# Patient Record
Sex: FEMALE | Race: WHITE
[De-identification: names, ages, dates, MRNs, and addresses within clinical notes are randomized per-mention and may not be internally consistent; named-entity substitution may affect disease eponyms.]

---

## 2019-06-05 ENCOUNTER — HOSPITAL ENCOUNTER (EMERGENCY)
Dept: HOSPITAL 91 - FTE | Age: 29
Discharge: HOME | End: 2019-06-05
Payer: COMMERCIAL

## 2019-06-05 ENCOUNTER — HOSPITAL ENCOUNTER (EMERGENCY)
Dept: HOSPITAL 10 - FTE | Age: 29
Discharge: HOME | End: 2019-06-05
Payer: COMMERCIAL

## 2019-06-05 VITALS — DIASTOLIC BLOOD PRESSURE: 81 MMHG | HEART RATE: 80 BPM | SYSTOLIC BLOOD PRESSURE: 107 MMHG | RESPIRATION RATE: 19 BRPM

## 2019-06-05 VITALS
HEIGHT: 68 IN | WEIGHT: 187.39 LBS | BODY MASS INDEX: 28.4 KG/M2 | WEIGHT: 187.39 LBS | HEIGHT: 68 IN | BODY MASS INDEX: 28.4 KG/M2

## 2019-06-05 DIAGNOSIS — R20.2: Primary | ICD-10-CM

## 2019-06-05 PROCEDURE — 81025 URINE PREGNANCY TEST: CPT

## 2019-06-05 PROCEDURE — 99283 EMERGENCY DEPT VISIT LOW MDM: CPT

## 2019-06-05 RX ADMIN — LORAZEPAM 1 MG: 0.5 TABLET ORAL at 19:32

## 2019-06-05 NOTE — ERD
ER Documentation


Chief Complaint


Chief Complaint





arm numbness/tingling sensation;dry lips;dizziness-pt drink alcohol last ni





HPI


28-year-old female, previously healthy, presents to the emergency department, 


complaining of sudden onset of dizziness, chest pain, palpitations associated 


with perioral numbness and finger paresthesias.  She has had similar episodes in


the past but less intense.  She is not taking any medication at this time.  His


tory provided by patient.  The patient denies headache, no distal weakness, 


numbness or tingling, no fever or chills.





ROS


All systems reviewed and are negative except as per history of present illness.





Medications


Home Meds


Active Scripts


Lorazepam* (Ativan*) 0.5 Mg Tablet, 0.5 MG PO Q8H PRN for ANXIETY, #10 TAB


   Prov:MARLEE FERGUSON MD         6/5/19





Allergies


Allergies:  


Coded Allergies:  


     No Known Allergy (Unverified , 6/5/19)





PMhx/Soc


Medical and Surgical Hx:  pt denies Medical Hx, pt denies Surgical Hx


Hx Alcohol Use:  Yes (socially)


Hx Substance Use:  No


Hx Tobacco Use:  No


Smoking Status:  Never smoker





FmHx


Family History:  No diabetes, No coronary disease





Physical Exam


Vitals





Vital Signs


  Date      Temp  Pulse  Resp  B/P (MAP)   Pulse Ox  O2          O2 Flow    FiO2


Time                                                 Delivery    Rate


    6/5/19  98.2     80    19      107/81        98


     18:04                           (90)





Physical Exam


Patient alert, oriented, vital signs stable.


HEAD: Normocephalic, atraumatic.


EYES: PERRLA, EOMI, Sclera and conjunctiva appear normal.  


NOSE: Clear and patent nostrils.


EARS: Canals clear, tympanic membranes WNL. 


MOUTH:  normal lips and tongue, no oral lesions.


THROAT: Normal oropharynx, no tonsillar exudates. 


NECK: Supple, No lymphadenopathy. Full ROM without pain or tenderness.


HEART:  RRR, no rubs, murmurs, clicks or gallops.


LUNGS: Clear to auscultation.


ABDOMEN: Soft, non-tender without masses or hepatosplenomegaly.


EXTREMITIES: No edema bilaterally.


BACK: Full ROM, no deformity, normal back exam


NEURO: Cranial nerves grossly intact, no motor or sensory deficit


SKIN: No rashes, no petechia.


Results 24 hrs





Laboratory Tests


                    Test
                      6/5/19
20:05


                    POC Beta HCG, Qualitative  NEGATIVE





Current Medications


 Medications
   Dose
          Sig/Abbe
       Start Time
   Status  Last


 (Trade)       Ordered        Route
 PRN     Stop Time              Admin
Dose


                              Reason                                Admin


 Lorazepam
     0.5 mg         ONCE  ONCE
    6/5/19        DC            6/5/19


(Ativan)                      PO
            19:30
 6/5/19                19:32



                                             19:31








Procedures/MDM


Patient presents complaining of one episode today of chest pain, palpitations, 


shortness of breath and perioral paresthesias.


Vital signs stable, Physical exam unremarkable, neurovascular exam intact.


Differential diagnosis include but not limited to: Depression, anxiety, mi


graine, thyroid disease, electrolyte imbalance.  Low suspicion for acute 


coronary event, aortic dissection, CVA.


Physical examination and clinical presentation consistent most likely with 


paresthesia, likely secondary to anxiety.


During the ED course the patient remained stable, no new complaints. The patient


received treatment with lorazepam presenting overall improvement of the s


ymptoms.


Results and clinical impression discussed with patient who agrees with 


management. The patient is stable to be treated outpatient and will be 


discharged home with a Rx for lorazepam, some side effects of prescribed 


medications (headache, rash, nausea, vomiting, diarrhea, drowsiness, 


habituation, bleeding, hypertension, interactions with other medications) were 


reviewed.





The patient was instructed to follow up with the primary care provider in the 


next 48h.  If symptoms persist, worsen or new symptoms develop, then patient 


should return to the ED immediately.





Instructions explained and given directly by me to the patient  with 


acknowledgment and demonstrated understanding.





Disclaimer: Inadvertent spelling and grammatical errors are likely due to E


HR/dictation software use and do not reflect on the overall quality of patient 


care. Also, please note that the electronic time recorded on this note does not 


necessarily reflect the actual time of the patient encounter.





Departure


Diagnosis:  


   Primary Impression:  


   Paresthesia


Condition:  Stable





Additional Instructions:  


Thank you very much for allowing us to participate in your care. 


Your health and safety is our top priority at [facility].


 


The evaluation in the emergency department has been done to rule out an acute 


emergency.  Chronic, non-life-threatening conditions may have not been 


evaluated; therefore, you need to follow up with a primary care provider in the 


next 48h.  If symptoms persist, worsen or new symptoms develop, then patient 


should return to the ED immediately.





Call your primary care doctor TOMORROW for an appointment during the next 2-4 


days and bring all the information provided. 





Have prescriptions filled and follow precisely the directions on the label. 





If the symptoms get worse and your provider is unavailable, return to the 


Emergency Department immediately.











MARLEE FERGUSON MD       Jun 5, 2019 19:26

## 2025-05-30 ENCOUNTER — HOSPITAL ENCOUNTER (EMERGENCY)
Dept: HOSPITAL 54 - ER | Age: 35
Discharge: HOME | End: 2025-05-30
Payer: COMMERCIAL

## 2025-05-30 VITALS — DIASTOLIC BLOOD PRESSURE: 75 MMHG | TEMPERATURE: 97.9 F | OXYGEN SATURATION: 97 % | SYSTOLIC BLOOD PRESSURE: 109 MMHG

## 2025-05-30 DIAGNOSIS — R07.89: Primary | ICD-10-CM

## 2025-05-30 DIAGNOSIS — F41.0: ICD-10-CM

## 2025-05-30 DIAGNOSIS — F41.9: ICD-10-CM

## 2025-05-30 LAB
BASOPHILS # BLD AUTO: 0.1 K/UL (ref 0–0.2)
BASOPHILS NFR BLD AUTO: 1.3 % (ref 0–2)
BUN SERPL-MCNC: 12 MG/DL (ref 7–18)
CALCIUM SERPL-MCNC: 9.1 MG/DL (ref 8.5–10.1)
CHLORIDE SERPL-SCNC: 107 MMOL/L (ref 98–107)
CO2 SERPL-SCNC: 22 MMOL/L (ref 21–32)
CREAT SERPL-MCNC: 0.6 MG/DL (ref 0.6–1.3)
EOSINOPHIL # BLD AUTO: 0.2 K/UL (ref 0–0.7)
EOSINOPHIL NFR BLD AUTO: 2.7 % (ref 0–6)
ERYTHROCYTE [DISTWIDTH] IN BLOOD BY AUTOMATED COUNT: 13.5 % (ref 11.5–15)
GLUCOSE SERPL-MCNC: 102 MG/DL (ref 74–106)
HCG UR QL: NEGATIVE
HCT VFR BLD AUTO: 35 % (ref 33–45)
HGB BLD-MCNC: 11.9 G/DL (ref 11.5–14.8)
LYMPHOCYTES NFR BLD AUTO: 1.2 K/UL (ref 0.8–4.8)
LYMPHOCYTES NFR BLD AUTO: 14.7 % (ref 20–44)
MCH RBC QN AUTO: 29 PG (ref 26–33)
MCHC RBC AUTO-ENTMCNC: 34 G/DL (ref 31–36)
MCV RBC AUTO: 84 FL (ref 82–100)
MONOCYTES NFR BLD AUTO: 0.4 K/UL (ref 0.1–1.3)
MONOCYTES NFR BLD AUTO: 5.1 % (ref 2–12)
NEUTROPHILS # BLD AUTO: 6.4 K/UL (ref 1.8–8.9)
NEUTROPHILS NFR BLD AUTO: 76.2 % (ref 43–81)
PLATELET # BLD AUTO: 230 K/UL (ref 150–450)
POTASSIUM SERPL-SCNC: 3.8 MMOL/L (ref 3.5–5.1)
RBC # BLD AUTO: 4.17 MIL/UL (ref 4–5.2)
SODIUM SERPL-SCNC: 142 MMOL/L (ref 136–145)
WBC NRBC COR # BLD AUTO: 8.4 K/UL (ref 4.3–11)

## 2025-05-30 PROCEDURE — 93005 ELECTROCARDIOGRAM TRACING: CPT

## 2025-05-30 PROCEDURE — 99285 EMERGENCY DEPT VISIT HI MDM: CPT

## 2025-05-30 PROCEDURE — 84484 ASSAY OF TROPONIN QUANT: CPT

## 2025-05-30 PROCEDURE — 96374 THER/PROPH/DIAG INJ IV PUSH: CPT

## 2025-05-30 PROCEDURE — 85025 COMPLETE CBC W/AUTO DIFF WBC: CPT

## 2025-05-30 PROCEDURE — 80048 BASIC METABOLIC PNL TOTAL CA: CPT

## 2025-05-30 PROCEDURE — 36415 COLL VENOUS BLD VENIPUNCTURE: CPT

## 2025-05-30 PROCEDURE — 84703 CHORIONIC GONADOTROPIN ASSAY: CPT

## 2025-05-30 PROCEDURE — 71045 X-RAY EXAM CHEST 1 VIEW: CPT

## 2025-05-30 RX ADMIN — KETOROLAC TROMETHAMINE ONE MG: 15 INJECTION, SOLUTION INTRAMUSCULAR; INTRAVENOUS at 17:11

## 2025-05-30 RX ADMIN — ACETAMINOPHEN ONE MG: 500 TABLET ORAL at 17:11
